# Patient Record
Sex: MALE | Race: WHITE | NOT HISPANIC OR LATINO | Employment: OTHER | URBAN - METROPOLITAN AREA
[De-identification: names, ages, dates, MRNs, and addresses within clinical notes are randomized per-mention and may not be internally consistent; named-entity substitution may affect disease eponyms.]

---

## 2020-01-16 ENCOUNTER — OFFICE VISIT (OUTPATIENT)
Dept: ORTHOPEDICS | Facility: CLINIC | Age: 35
End: 2020-01-16
Payer: OTHER GOVERNMENT

## 2020-01-16 VITALS
RESPIRATION RATE: 18 BRPM | HEIGHT: 68 IN | SYSTOLIC BLOOD PRESSURE: 114 MMHG | DIASTOLIC BLOOD PRESSURE: 70 MMHG | WEIGHT: 184.94 LBS | BODY MASS INDEX: 28.03 KG/M2

## 2020-01-16 DIAGNOSIS — Z98.890 HISTORY OF REPAIR OF ACL: ICD-10-CM

## 2020-01-16 DIAGNOSIS — M25.562 CHRONIC PAIN OF LEFT KNEE: ICD-10-CM

## 2020-01-16 DIAGNOSIS — G89.29 CHRONIC PAIN OF LEFT KNEE: ICD-10-CM

## 2020-01-16 DIAGNOSIS — M75.52 SUBACROMIAL BURSITIS OF LEFT SHOULDER JOINT: Primary | ICD-10-CM

## 2020-01-16 DIAGNOSIS — M25.561 ACUTE PAIN OF RIGHT KNEE: ICD-10-CM

## 2020-01-16 PROCEDURE — 99204 PR OFFICE/OUTPT VISIT, NEW, LEVL IV, 45-59 MIN: ICD-10-PCS | Mod: 25,S$PBB,, | Performed by: PHYSICIAN ASSISTANT

## 2020-01-16 PROCEDURE — 99213 OFFICE O/P EST LOW 20 MIN: CPT | Mod: PBBFAC,25,PN | Performed by: PHYSICIAN ASSISTANT

## 2020-01-16 PROCEDURE — 99999 PR PBB SHADOW E&M-EST. PATIENT-LVL III: CPT | Mod: PBBFAC,,, | Performed by: PHYSICIAN ASSISTANT

## 2020-01-16 PROCEDURE — 20610 DRAIN/INJ JOINT/BURSA W/O US: CPT | Mod: S$PBB,LT,, | Performed by: PHYSICIAN ASSISTANT

## 2020-01-16 PROCEDURE — 99999 PR PBB SHADOW E&M-EST. PATIENT-LVL III: ICD-10-PCS | Mod: PBBFAC,,, | Performed by: PHYSICIAN ASSISTANT

## 2020-01-16 PROCEDURE — 99204 OFFICE O/P NEW MOD 45 MIN: CPT | Mod: 25,S$PBB,, | Performed by: PHYSICIAN ASSISTANT

## 2020-01-16 PROCEDURE — 20610 DRAIN/INJ JOINT/BURSA W/O US: CPT | Mod: PBBFAC,PN | Performed by: PHYSICIAN ASSISTANT

## 2020-01-16 PROCEDURE — 20610 LARGE JOINT ASPIRATION/INJECTION: L SUBACROMIAL BURSA: ICD-10-PCS | Mod: S$PBB,LT,, | Performed by: PHYSICIAN ASSISTANT

## 2020-01-16 RX ORDER — TRIAMCINOLONE ACETONIDE 40 MG/ML
40 INJECTION, SUSPENSION INTRA-ARTICULAR; INTRAMUSCULAR
Status: DISCONTINUED | OUTPATIENT
Start: 2020-01-16 | End: 2020-01-16 | Stop reason: HOSPADM

## 2020-01-16 RX ADMIN — TRIAMCINOLONE ACETONIDE 40 MG: 40 INJECTION, SUSPENSION INTRA-ARTICULAR; INTRAMUSCULAR at 01:01

## 2020-01-16 NOTE — PROGRESS NOTES
SUBJECTIVE:     Chief Complaint & History of Present Illness:  Cristi Karimi is a 34 y.o. year old male who presents today with intermittent left shoulder pain that started 3-4 weeks ago.  He is right hand dominant.  There is not a history of injury.  He noticed the pain began after exercising in the gym and playing basketball.  He does exercise and weight train regularly. The pain is located in the  lateral aspect of the shoulder.  The pain is described as achy, 7/10.  It is aggravated by reaching forward and to the side. No numbness, tingling or weakness. Previous treatments include rest, and nsaids.  He has avoided all upper body exercises.  He has not noticed any improvement.  There is not a history of previous injury or surgery to the shoulder.      He also complains of chronic left knee popping and cracking with complete extension of the knee.  He has left knee ACL repair and meniscectomy about 5 years ago.  He states he has had this problem since.  He is not having pain, but is concerned about the popping.  He denies locking, catching, giveaway weakness. He is also complaining of pain in his right knee.  This pain started about 1 week ago.  It is starting to improve.  The pain is worse with bending and kneeling.  The pain is along the superior aspect of his patella.  He has been in the  with a physically demanding job for many years.     Review of patient's allergies indicates:  No Known Allergies      Current Outpatient Medications   Medication Sig Dispense Refill    ibuprofen (ADVIL,MOTRIN) 600 MG tablet Take 1 tablet (600 mg total) by mouth every 6 (six) hours as needed for Pain (for pain). 30 tablet 0     No current facility-administered medications for this visit.        Past Medical History:   Diagnosis Date    Migraine headache        Past Surgical History:   Procedure Laterality Date    ANTERIOR CRUCIATE LIGAMENT REPAIR Left 02/14       Vital Signs (Most Recent)  Vitals:    01/16/20  1318   BP: 114/70   Resp: 18       Review of Systems:  ROS:  Constitutional: no fever or chills  Eyes: no visual changes  ENT: no nasal congestion or sore throat  Respiratory: no cough or shortness of breath  Cardiovascular: no chest pain or palpitations  Gastrointestinal: no nausea or vomiting, tolerating diet  Genitourinary: no hematuria or dysuria  Integument/Breast: no rash or pruritis  Hematologic/Lymphatic: no easy bruising or lymphadenopathy  Musculoskeletal: no arthralgias or myalgias  Neurological: no seizures or tremors  Behavioral/Psych: no auditory or visual hallucinations  Endocrine: no heat or cold intolerance      OBJECTIVE:     PHYSICAL EXAM:  Vitals:    01/16/20 1318   BP: 114/70   Resp: 18           General: Weight: 83.9 kg (184 lb 15.5 oz) Body mass index is 28.12 kg/m².  Patient is alert, awake and oriented to time, place and person. Mood and affect are appropriate.  Patient does not appear to be in any distress, denies any constitutional symptoms and appears stated age.   HEENT: Pupils are equal and round, sclera are not injected. External examination of ears and nose reveals no abnormalities. Cranial nerves II-X are grossly intact  Neck: examination demonstrates painless active range of motion. Spurling's sign is negative  Skin: no rashes, abrasions or open wounds on the affected extremity   Resp: No respiratory distress or audible wheezing   Psych:  normal mood and behavior  CV: 2+ pulses, all extremities warm and well perfused   Left Shoulder   No swelling or atrophy  Tenderness: lateral acromial  FROM  +painful arc  Shoulder Strength: biceps 5/5, triceps 5/5, abduction 5/5, adduction 5/5  positive for impingement sign, sensory exam normal and motor exam normal  Stability tests: normal  Special Tests:    Crossbody test: negative    Neer's positive  Hawkin's positive    Jeanette's negative  Drop arm negative  Belly press negative  Liftoff negative  Speeds negative    IMAGING:  X-rays of the left  shoulder, personally reviewed by me, demonstrate well maintained joint space.  No fracture or dislocation.    X-rays of the bilateral knee, personally reviewed by me, demonstrate mild degenerative changes of patellofemoral joint left knee with satisfactory position of button from prior ACL surgery.  Right knee has well maintained joint space.  No fracture or dislocation.     ASSESSMENT/PLAN:   34 y.o. year old male with left shoulder subacromial bursitis, right knee pain, left knee chondromalacia patella    Plan: We discussed with the patient at length all the different treatment options available for his left shoulder including anti-inflammatories, acetaminophen, rest, ice, Physical therapy to include strengthening exercise, occasional cortisone injections for temporary relief  - He was given left shoulder CSI today.  Recommend rest, ice, activity modifcation, avoid strenuous weight lifting for the next couple of weeks.  - Follow up if symptoms worsen or fail to improve.  Consider MRI left shoulder if no improvement.  - He has mild degenerative changes of the left knee likely due to strenuous nature of training and prior injury.  - Right knee pain improving- rest, activity modification and follow up if symptoms persist

## 2020-01-16 NOTE — PROCEDURES
Large Joint Aspiration/Injection: L subacromial bursa  Date/Time: 1/16/2020 1:00 PM  Performed by: Guerline Rubin PA-C  Authorized by: Guerline Rubin PA-C     Consent Done?:  Yes (Verbal)  Indications:  Pain  Timeout: Prior to procedure the correct patient, procedure, and site was verified    Anesthesia    Anesthetic: topical anesthetic    Location:  Shoulder  Site:  L subacromial bursa  Prep: Patient was prepped and draped in usual sterile fashion    Needle size:  22 G  Approach:  Posterior  Medications:  40 mg triamcinolone acetonide 40 mg/mL  Patient tolerance:  Patient tolerated the procedure well with no immediate complications

## 2020-02-06 ENCOUNTER — OFFICE VISIT (OUTPATIENT)
Dept: ORTHOPEDICS | Facility: CLINIC | Age: 35
End: 2020-02-06
Payer: OTHER GOVERNMENT

## 2020-02-06 VITALS
OXYGEN SATURATION: 98 % | BODY MASS INDEX: 27.13 KG/M2 | WEIGHT: 179 LBS | HEART RATE: 58 BPM | DIASTOLIC BLOOD PRESSURE: 80 MMHG | RESPIRATION RATE: 18 BRPM | SYSTOLIC BLOOD PRESSURE: 114 MMHG | HEIGHT: 68 IN

## 2020-02-06 DIAGNOSIS — M75.52 SUBACROMIAL BURSITIS OF LEFT SHOULDER JOINT: Primary | ICD-10-CM

## 2020-02-06 PROCEDURE — 99999 PR PBB SHADOW E&M-EST. PATIENT-LVL IV: ICD-10-PCS | Mod: PBBFAC,,, | Performed by: PHYSICIAN ASSISTANT

## 2020-02-06 PROCEDURE — 99999 PR PBB SHADOW E&M-EST. PATIENT-LVL IV: CPT | Mod: PBBFAC,,, | Performed by: PHYSICIAN ASSISTANT

## 2020-02-06 PROCEDURE — 99213 OFFICE O/P EST LOW 20 MIN: CPT | Mod: S$PBB,,, | Performed by: PHYSICIAN ASSISTANT

## 2020-02-06 PROCEDURE — 99214 OFFICE O/P EST MOD 30 MIN: CPT | Mod: PBBFAC,PN | Performed by: PHYSICIAN ASSISTANT

## 2020-02-06 PROCEDURE — 99213 PR OFFICE/OUTPT VISIT, EST, LEVL III, 20-29 MIN: ICD-10-PCS | Mod: S$PBB,,, | Performed by: PHYSICIAN ASSISTANT

## 2020-02-06 NOTE — PROGRESS NOTES
"Patient ID: Cristi Karimi is a 34 y.o. male.    Chief Complaint: Pain of the Left Shoulder      HISTORY:  Cristi Karimi is a 34 y.o. male who returns to me today for follow up of left shoulder pain.  He was last seen by me 1/16/2020.  Today he is doing well, but continues to complain of pain in his shoulder.  He had a steroid injection at his last visit, which he reports only gave him about 2 days of relief.  His pain is worse with activity such as reaching forward, overhead activity and reaching to the side.  He denies numbness or tingling.  This has been persistent for two months and he has tried rest, activity modification, and nsaids in addition to the steroid injection.       PMH/PSH/FamHx/SocHx:    Unchanged from prior visit.    ROS:  Constitution: Negative for chills, fever and weakness.   Cardiovascular: Negative for chest pain.   Respiratory: Negative for cough and shortness of breath.   Hematologic/Lymphatic: Negative for bleeding problem. Does not bruise/bleed easily.   Skin: Negative for color change, itching and poor wound healing.   Musculoskeletal: Positive for left shoulder pain  Gastrointestinal: Negative for heartburn.   Neurological: Negative for dizziness, focal weakness, numbness, paresthesias and sensory change.   Psychiatric/Behavioral: The patient is not nervous/anxious.   Allergic/Immunologic: Negative for environmental allergies and persistent infections.     PHYSICAL EXAM:   /80 (BP Location: Right arm, Patient Position: Sitting, BP Method: Large (Manual))   Pulse (!) 58   Resp 18   Ht 5' 8" (1.727 m)   Wt 81.2 kg (179 lb 0.2 oz)   SpO2 98%   BMI 27.22 kg/m²   Left Shoulder  Skin intact, no swelling or erythema  Tenderness: none  Range of motion is painful   ROM: forward flexion 180/180, external rotation 50/50, internal rotation T7/T7  Shoulder Strength: biceps 5/5, triceps 5/5, abduction 4/5, adduction 4/5  negative for tenderness over the acromioclavicular joint, " positive for impingement sign, sensory exam normal and motor exam normal  Stability tests: normal  Special Tests:    Crossbody test: negative    Neer's positive  Hawkin's positive    Jeanette's positive  Drop arm negative  Belly press negative    Speeds negative    IMAGING:  No new imaging  X-rays of the left shoulder, personally reviewed by me, demonstrate well maintained joint space.  No fracture or dislocation.    ASSESSMENT/PLAN:    Cristi was seen today for pain.    Diagnoses and all orders for this visit:    Subacromial bursitis of left shoulder joint  -     MRI Shoulder Without Contrast Left; Future      -  He has persistent shoulder pain concerning for rotator cuff injury.  He has failed to respond to conservative treatment consisting of rest, activity modification, nsaids, and steroid injection over the past 2 months.  I have recommended MRI of the left shoulder.  I will call with results.

## 2020-02-13 ENCOUNTER — TELEPHONE (OUTPATIENT)
Dept: ORTHOPEDICS | Facility: CLINIC | Age: 35
End: 2020-02-13

## 2020-02-13 ENCOUNTER — HOSPITAL ENCOUNTER (OUTPATIENT)
Dept: RADIOLOGY | Facility: HOSPITAL | Age: 35
Discharge: HOME | End: 2020-02-13
Attending: PHYSICIAN ASSISTANT
Payer: OTHER GOVERNMENT

## 2020-02-13 DIAGNOSIS — M75.52 SUBACROMIAL BURSITIS OF LEFT SHOULDER JOINT: ICD-10-CM

## 2020-02-13 PROCEDURE — 73221 MRI JOINT UPR EXTREM W/O DYE: CPT | Mod: TC,PO,LT

## 2020-02-13 PROCEDURE — 73221 MRI SHOULDER WITHOUT CONTRAST LEFT: ICD-10-PCS | Mod: 26,LT,, | Performed by: RADIOLOGY

## 2020-02-13 PROCEDURE — 73221 MRI JOINT UPR EXTREM W/O DYE: CPT | Mod: 26,LT,, | Performed by: RADIOLOGY

## 2020-02-13 RX ORDER — MELOXICAM 15 MG/1
15 TABLET ORAL DAILY
Qty: 30 TABLET | Refills: 1 | Status: SHIPPED | OUTPATIENT
Start: 2020-02-13

## 2020-02-13 NOTE — TELEPHONE ENCOUNTER
MRI results reviewed with patient.  Will try course of meloxicam.  Follow up in a few weeks if symptoms persist.

## 2020-03-11 ENCOUNTER — OFFICE VISIT (OUTPATIENT)
Dept: PLASTIC SURGERY | Facility: CLINIC | Age: 35
End: 2020-03-11
Payer: OTHER GOVERNMENT

## 2020-03-11 VITALS
WEIGHT: 176.94 LBS | BODY MASS INDEX: 26.82 KG/M2 | HEIGHT: 68 IN | HEART RATE: 52 BPM | DIASTOLIC BLOOD PRESSURE: 79 MMHG | SYSTOLIC BLOOD PRESSURE: 136 MMHG

## 2020-03-11 DIAGNOSIS — Z87.730 HISTORY OF CLEFT PALATE WITH CLEFT LIP: Primary | ICD-10-CM

## 2020-03-11 PROCEDURE — 99203 OFFICE O/P NEW LOW 30 MIN: CPT | Mod: S$PBB,,, | Performed by: SURGERY

## 2020-03-11 PROCEDURE — 99213 OFFICE O/P EST LOW 20 MIN: CPT | Mod: PBBFAC | Performed by: SURGERY

## 2020-03-11 PROCEDURE — 99203 PR OFFICE/OUTPT VISIT, NEW, LEVL III, 30-44 MIN: ICD-10-PCS | Mod: S$PBB,,, | Performed by: SURGERY

## 2020-03-11 PROCEDURE — 99999 PR PBB SHADOW E&M-EST. PATIENT-LVL III: ICD-10-PCS | Mod: PBBFAC,,, | Performed by: SURGERY

## 2020-03-11 PROCEDURE — 99999 PR PBB SHADOW E&M-EST. PATIENT-LVL III: CPT | Mod: PBBFAC,,, | Performed by: SURGERY

## 2020-03-11 NOTE — PROGRESS NOTES
"History & Physical    SUBJECTIVE:     History of Present Illness:  Patient is a 34 y.o. male presents with a history of a cleft lip and palate repair who wishes to discuss revision options.  He states that for a couple months now he has noticed issues with eating and drinking.  He states that he has now developed a hole in the roof of his palate and upper gum line.  This has caused him to have water in his nose while drinking.  Otherwise he states he is not having any issues at this time.  No voice changes or difficulties.      No chief complaint on file.      Review of patient's allergies indicates:  No Known Allergies    Current Outpatient Medications   Medication Sig Dispense Refill    ibuprofen (ADVIL,MOTRIN) 600 MG tablet Take 1 tablet (600 mg total) by mouth every 6 (six) hours as needed for Pain (for pain). 30 tablet 0    meloxicam (MOBIC) 15 MG tablet Take 1 tablet (15 mg total) by mouth once daily. 30 tablet 1     No current facility-administered medications for this visit.        Past Medical History:   Diagnosis Date    Migraine headache      Past Surgical History:   Procedure Laterality Date    ANTERIOR CRUCIATE LIGAMENT REPAIR Left 02/14     Family History   Problem Relation Age of Onset    Diabetes Mother     Hypertension Mother     Hypertension Father     Cancer Paternal Grandfather      Social History     Tobacco Use    Smoking status: Never Smoker   Substance Use Topics    Alcohol use: Yes     Alcohol/week: 2.0 standard drinks     Types: 1 Cans of beer, 1 Shots of liquor per week     Comment: occasional    Drug use: No        Review of Systems:  Review of Systems   Constitutional: Negative for chills, fever and unexpected weight change.   HENT: Negative for congestion and voice change.        OBJECTIVE:     Vital Signs (Most Recent)  Pulse: (!) 52 (03/11/20 1554)  BP: 136/79 (03/11/20 1554)  5' 8" (1.727 m)  80.3 kg (176 lb 14.7 oz)     Physical Exam:  Physical Exam   Constitutional: He " is oriented to person, place, and time. He appears well-developed and well-nourished.   HENT:   No visible hole appreciated on hard or soft palate.  Has a bifid uvula.  At upper gum there is possibly a small whole but not clearly visible.     Eyes: EOM are normal.   Neck: Normal range of motion.   Cardiovascular: Normal rate.   Pulmonary/Chest: Effort normal.   Musculoskeletal: Normal range of motion.   Neurological: He is alert and oriented to person, place, and time.   Skin: Skin is warm and dry.   Psychiatric: He has a normal mood and affect.     Laboratory  None    Diagnostic Results:  None    ASSESSMENT/PLAN:     Mr. Karimi is our 35yo male who presents for discussion of cleft palate/lip revision.  At this time we do not recommend a revision as the defect is not visible and would be a massive undertaking to repair.  Discussed that the patient can seek another opinion and have offered him multiple names of craniofacial physicians he can go see for an opinion/surgery.  The patient can follow up in our clinic as needed.    I took a wound look at his palate.  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  He is not complaining of any speech problems although he is hyper nasal.  I have discussed with him in order to repair this very tiny fistula he would need to have the palate really elevated and that recurrence rates would be upwards of 40%..  I do not recommend him having the surgery.  But I told him he should get a 2nd opinion.

## 2021-04-26 ENCOUNTER — PATIENT MESSAGE (OUTPATIENT)
Dept: RESEARCH | Facility: HOSPITAL | Age: 36
End: 2021-04-26